# Patient Record
Sex: MALE | URBAN - METROPOLITAN AREA
[De-identification: names, ages, dates, MRNs, and addresses within clinical notes are randomized per-mention and may not be internally consistent; named-entity substitution may affect disease eponyms.]

---

## 2017-03-15 ENCOUNTER — OFFICE VISIT (OUTPATIENT)
Dept: FAMILY MEDICINE | Facility: CLINIC | Age: 32
End: 2017-03-15
Payer: COMMERCIAL

## 2017-03-15 VITALS
DIASTOLIC BLOOD PRESSURE: 60 MMHG | RESPIRATION RATE: 20 BRPM | SYSTOLIC BLOOD PRESSURE: 110 MMHG | TEMPERATURE: 97.8 F | HEART RATE: 77 BPM | OXYGEN SATURATION: 97 % | WEIGHT: 237 LBS | BODY MASS INDEX: 36.04 KG/M2

## 2017-03-15 DIAGNOSIS — L40.9 PSORIASIS: Primary | ICD-10-CM

## 2017-03-15 DIAGNOSIS — B35.1 FUNGAL NAIL INFECTION: ICD-10-CM

## 2017-03-15 PROCEDURE — 99214 OFFICE O/P EST MOD 30 MIN: CPT | Performed by: PHYSICIAN ASSISTANT

## 2017-03-15 RX ORDER — BETAMETHASONE DIPROPIONATE 0.5 MG/G
OINTMENT, AUGMENTED TOPICAL
Qty: 90 G | Refills: 3 | Status: SHIPPED | OUTPATIENT
Start: 2017-03-15

## 2017-03-15 RX ORDER — CICLOPIROX 80 MG/ML
SOLUTION TOPICAL WEEKLY
Qty: 6.6 ML | Refills: 0 | Status: SHIPPED | OUTPATIENT
Start: 2017-03-15

## 2017-03-15 RX ORDER — TRIAMCINOLONE ACETONIDE 1 MG/G
CREAM TOPICAL
Qty: 30 G | Refills: 0 | Status: SHIPPED | OUTPATIENT
Start: 2017-03-15

## 2017-03-15 NOTE — MR AVS SNAPSHOT
After Visit Summary   3/15/2017    Telly Hanson    MRN: [id number]           Patient Information     Date Of Birth          1985        Visit Information        Provider Department      3/15/2017 8:10 AM Shira Rose PA-C [facility]        Today's Diagnoses     Fungal nail infection    -  1    Psoriasis           Follow-ups after your visit        Additional Services     DERMATOLOGY REFERRAL       [facility] [phone]   [email or web address]    Please be aware that coverage of these services is subject to the terms and limitations of your health insurance plan.  Call member services at your health plan with any benefit or coverage questions.      Please bring the following with you to your appointment:    (1) Any X-Rays, CTs or MRIs which have been performed.  Contact the facility where they were done to arrange for  prior to your scheduled appointment.    (2) List of current medications  (3) This referral request   (4) Any documents/labs given to you for this referral                  Who to contact     If you have questions or need follow up information about today's clinic visit or your schedule please contact [facility] directly at [phone].  Normal or non-critical lab and imaging results will be communicated to you by MyChart, letter or phone within 4 business days after the clinic has received the results. If you do not hear from us within 7 days, please contact the clinic through MyChart or phone. If you have a critical or abnormal lab result, we will notify you by phone as soon as possible.  Submit refill requests through [employer] or call your pharmacy and they will forward the refill request to us. Please allow 3 business days for your refill to be completed.          Additional Information About  "Your Visit        [employer] Information     [employer] lets you send messages to your doctor, view your test results, renew your prescriptions, schedule appointments and more. To sign up, go to [email or web address] . Click on \"Log in\" on the left side of the screen, which will take you to the Welcome page. Then click on \"Sign up Now\" on the right side of the page.     You will be asked to enter the access code listed below, as well as some personal information. Please follow the directions to create your username and password.     Your access code is: [email or web address]  Expires: 2017  8:38 AM     Your access code will  in 90 days. If you need help or a new code, please call your [place] clinic or [phone].        Care EveryWhere ID     This is your Care EveryWhere ID. This could be used by other organizations to access your [place] medical records  [id number]        Your Vitals Were     Pulse Temperature Respirations Pulse Oximetry BMI (Body Mass Index)       77 97.8  F (36.6  C) (Tympanic) 20 97% 36.04 kg/m2        Blood Pressure from Last 3 Encounters:   03/15/17 110/60   09/18/15 110/65   08/18/15 110/74    Weight from Last 3 Encounters:   03/15/17 237 lb (107.5 kg)   08/18/15 229 lb 9.6 oz (104.1 kg)   01/12/15 220 lb (99.8 kg)              We Performed the Following     DERMATOLOGY REFERRAL          Today's Medication Changes          These changes are accurate as of: 3/15/17  8:38 AM.  If you have any questions, ask your nurse or doctor.               Start taking these medicines.        Dose/Directions    ciclopirox 8 % Soln   Used for:  Fungal nail infection   Started by:  Shira Rose PA-C        Externally apply topically once a week   Quantity:  6.6 mL   Refills:  0       triamcinolone 0.1 % cream   Commonly known as:  KENALOG   Used for:  Psoriasis   Started by:  Shira Rose PA-C        Apply sparingly to affected area three times daily as needed   Quantity:  30 g   Refills:  0 "            Where to get your medicines      These medications were sent to [id number] - [place], [place] - [address]  [address] [facility] [address]     Phone:  [phone]     augmented betamethasone dipropionate 0.05 % ointment    ciclopirox 8 % Soln    triamcinolone 0.1 % cream                Primary Care Provider    None Specified       No primary provider on file.        Thank you!     Thank you for choosing [facility]  for your care. Our goal is always to provide you with excellent care. Hearing back from our patients is one way we can continue to improve our services. Please take a few minutes to complete the written survey that you may receive in the mail after your visit with us. Thank you!             Your Updated Medication List - Protect others around you: Learn how to safely use, store and throw away your medicines at www.disposemymeds.org.          This list is accurate as of: 3/15/17  8:38 AM.  Always use your most recent med list.                   Brand Name Dispense Instructions for use    augmented betamethasone dipropionate 0.05 % ointment    DIPROLENE-AF    90 g    Apply sparingly to affected area twice daily as needed.  Do not apply to face.       ciclopirox 8 % Soln     6.6 mL    Externally apply topically once a week       triamcinolone 0.1 % cream    KENALOG    30 g    Apply sparingly to affected area three times daily as needed

## 2017-03-15 NOTE — NURSING NOTE
"Chief Complaint   Patient presents with     Psoriasis     Finger     nail problem       Initial /60 (BP Location: Right arm, Cuff Size: Adult Large)  Pulse 77  Temp 97.8  F (36.6  C) (Tympanic)  Resp 20  Wt 237 lb (107.5 kg)  SpO2 97%  BMI 36.04 kg/m2 Estimated body mass index is 36.04 kg/(m^2) as calculated from the following:    Height as of 1/12/15: 5' 8\" (1.727 m).    Weight as of this encounter: 237 lb (107.5 kg).  Medication Reconciliation: complete   Demetra Fritz CMA    "

## 2017-03-15 NOTE — PROGRESS NOTES
SUBJECTIVE:                                                    Telly Hanson is a 31 year old male who presents to clinic today for the following health issues:      psoriasis      Duration: ongoing    Description (location/character/radiation): majority on legs, bridge on nose    Intensity:  moderate    Accompanying signs and symptoms: redness    History (similar episodes/previous evaluation): yes    Precipitating or alleviating factors: None    Therapies tried and outcome: not on any current medication       Nail issue      Duration: 6 months    Description (location/character/radiation): left hand,     Intensity:  Pain with pressure    Accompanying signs and symptoms: discoloration    History (similar episodes/previous evaluation): None    Precipitating or alleviating factors: None    Therapies tried and outcome: None       HPI additional notes:   Chief Complaint   Patient presents with     Psoriasis     Finger     nail problem     Telly presents today with multiple skin complaints:    - Patient has long-standing hx psoriasis. Prescribed Diprolene ointment, but admits he is not great with consistent use. Also main issue is patch on his nose and cannot use Diprolene on the face. Patient gets patches around ears, bilat shins, bilat elbows -- manageable with Diprolene when he is compliant. Wondering if there is anything he can do beyond the ointment.    - Patient c/o fungal infection in multiple fingernails. Has tried clipping them down, but never clears.     ROS:  Skin: as above  Eyes: negative  Ears/Nose/Throat: negative  Respiratory: No shortness of breath, dyspnea on exertion, cough, or hemoptysis  Cardiovascular: negative  Gastrointestinal: negative  Genitourinary: negative  Musculoskeletal: negative  Neurologic: negative  Psychiatric: negative  Hematologic/Lymphatic/Immunologic: negative  Endocrine: negative    Chart Review:  History   Smoking Status     Current Every Day Smoker     Packs/day: 0.50      Types: Cigarettes   Smokeless Tobacco     Never Used       Patient Active Problem List   Diagnosis     Psoriasis     CARDIOVASCULAR SCREENING; LDL GOAL LESS THAN 160     History reviewed. No pertinent past surgical history.  Problem list, Medication list, Allergies, Medical/Social/Surg hx reviewed in [facility], updated as appropriate.   OBJECTIVE:                                                    /60 (BP Location: Right arm, Cuff Size: Adult Large)  Pulse 77  Temp 97.8  F (36.6  C) (Tympanic)  Resp 20  Wt 237 lb (107.5 kg)  SpO2 97%  BMI 36.04 kg/m2  Body mass index is 36.04 kg/(m^2).  GENERAL:  WDWN, no acute distress  PSYCH: pleasant, cooperative  EYES: no discharge, no injection  HENT:  Normocephalic. Moist mucus membranes.  NECK:  Supple, symmetric  EXTREMITIES:  No gross deformities, moves all 4 limbs spontaneously, no peripheral edema  SKIN: scaly, pink-erythematous patches on right lateral bridge of nose, superior aspect of Rt pinna, bilat shins, Rt extensor elbow. Patches of yellow, thickened, flaky nails on Lt 1st, 3rd, and 5th fingers and Rt 2nd finger.  NEUROLOGIC: alert, sensation grossly intact.    Diagnostic test results: none     ASSESSMENT/PLAN:                                                          ICD-10-CM    1. Psoriasis L40.9 DERMATOLOGY REFERRAL     triamcinolone (KENALOG) 0.1 % cream     augmented betamethasone dipropionate (DIPROLENE-AF) 0.05 % ointment   2. Fungal nail infection B35.1 ciclopirox 8 % SOLN     - Discussed topicals are generally the mainstay of treatment for psoriasis; key is consistent use. Discussed use of triamcinolone for facial plaques, be careful when applying near eyes. Continue use of Diprolene prn. Recommend referral to derm to see if he is a candidate for photo/light therapy and discuss other options for treatment.    - Discussed use of ciclopirox to help with nail fungus. Apply to all nails, whether you note sx or not. Remove with rubbing alcohol and reapply  fresh coat once weekly. Discussed may take 8-12 weeks for complete resolution. Keep nails trimmed and clean.    Please see patient instructions for treatment details.  25 minutes total spent on this encounter, >50% spent in direct communication with the patient.    Follow up with specialist as referred.    Shira Rose PA-C  [facility]